# Patient Record
Sex: MALE | Race: BLACK OR AFRICAN AMERICAN | Employment: FULL TIME | URBAN - NONMETROPOLITAN AREA
[De-identification: names, ages, dates, MRNs, and addresses within clinical notes are randomized per-mention and may not be internally consistent; named-entity substitution may affect disease eponyms.]

---

## 2021-10-05 ENCOUNTER — HOSPITAL ENCOUNTER (EMERGENCY)
Age: 26
Discharge: HOME | End: 2021-10-05
Attending: STUDENT IN AN ORGANIZED HEALTH CARE EDUCATION/TRAINING PROGRAM

## 2021-10-05 VITALS
SYSTOLIC BLOOD PRESSURE: 138 MMHG | OXYGEN SATURATION: 97 % | WEIGHT: 160 LBS | BODY MASS INDEX: 20.53 KG/M2 | HEIGHT: 74 IN | HEART RATE: 83 BPM | TEMPERATURE: 98.7 F | RESPIRATION RATE: 17 BRPM | DIASTOLIC BLOOD PRESSURE: 67 MMHG

## 2021-10-05 DIAGNOSIS — Z20.822 COVID-19 VIRUS TEST RESULT UNKNOWN: ICD-10-CM

## 2021-10-05 DIAGNOSIS — B34.9 VIRAL SYNDROME: Primary | ICD-10-CM

## 2021-10-05 LAB
GROUP A STREP CULTURE, REFLEX: NEGATIVE
HETEROPHILE ANTIBODIES: NEGATIVE
REFLEX THROAT C + S: NORMAL

## 2021-10-05 PROCEDURE — 36415 COLL VENOUS BLD VENIPUNCTURE: CPT

## 2021-10-05 PROCEDURE — U0005 INFEC AGEN DETEC AMPLI PROBE: HCPCS

## 2021-10-05 PROCEDURE — 87880 STREP A ASSAY W/OPTIC: CPT

## 2021-10-05 PROCEDURE — 6370000000 HC RX 637 (ALT 250 FOR IP): Performed by: STUDENT IN AN ORGANIZED HEALTH CARE EDUCATION/TRAINING PROGRAM

## 2021-10-05 PROCEDURE — 99282 EMERGENCY DEPT VISIT SF MDM: CPT

## 2021-10-05 PROCEDURE — 86308 HETEROPHILE ANTIBODY SCREEN: CPT

## 2021-10-05 PROCEDURE — 87070 CULTURE OTHR SPECIMN AEROBIC: CPT

## 2021-10-05 PROCEDURE — U0003 INFECTIOUS AGENT DETECTION BY NUCLEIC ACID (DNA OR RNA); SEVERE ACUTE RESPIRATORY SYNDROME CORONAVIRUS 2 (SARS-COV-2) (CORONAVIRUS DISEASE [COVID-19]), AMPLIFIED PROBE TECHNIQUE, MAKING USE OF HIGH THROUGHPUT TECHNOLOGIES AS DESCRIBED BY CMS-2020-01-R: HCPCS

## 2021-10-05 RX ORDER — IBUPROFEN 200 MG
600 TABLET ORAL ONCE
Status: COMPLETED | OUTPATIENT
Start: 2021-10-05 | End: 2021-10-05

## 2021-10-05 RX ORDER — IBUPROFEN 600 MG/1
600 TABLET ORAL EVERY 6 HOURS PRN
Qty: 30 TABLET | Refills: 0 | Status: SHIPPED | OUTPATIENT
Start: 2021-10-05

## 2021-10-05 RX ADMIN — IBUPROFEN 600 MG: 200 TABLET, FILM COATED ORAL at 17:27

## 2021-10-05 ASSESSMENT — ENCOUNTER SYMPTOMS
CONSTIPATION: 0
SHORTNESS OF BREATH: 0
EYE REDNESS: 0
DIARRHEA: 0
SORE THROAT: 1
VOMITING: 0
NAUSEA: 0
RHINORRHEA: 0
COUGH: 1
ABDOMINAL PAIN: 0

## 2021-10-05 ASSESSMENT — PAIN SCALES - GENERAL
PAINLEVEL_OUTOF10: 7
PAINLEVEL_OUTOF10: 7

## 2021-10-05 ASSESSMENT — PAIN DESCRIPTION - LOCATION: LOCATION: GENERALIZED

## 2021-10-05 ASSESSMENT — PAIN DESCRIPTION - PAIN TYPE: TYPE: ACUTE PAIN

## 2021-10-05 NOTE — ED PROVIDER NOTES
[facility] ENCOUNTER          Pt Name: Harmony Chávez  MRN: [id number]  [place] 1995  Date of evaluation: 10/5/2021  Physician: Dasha Dupree MD    CHIEF COMPLAINT       Chief Complaint   Patient presents with    Generalized Body Aches     History obtained from the patient. HISTORY OF PRESENT ILLNESS    HPI  Harmony Chávez is a 27 y.o. male with a history of asthma who presents to the emergency department for evaluation of myalgias, fatigue, sore throat. Patient states that he just arrived from [address] [place] [address] yesterday evening and since arriving has had generalized fatigue, myalgias, sore throat, and cough. Patient states that he also has chills. Patient denies nausea/vomiting. He states that he feels \"rundown\". Patient denies shortness of breath, chest pain. Patient denies taking any medications prior to arrival.  Patient denies sick contacts. Patient has not received his Covid vaccines. The patient has no other acute complaints at this time. REVIEW OF SYSTEMS   Review of Systems   Constitutional: Positive for fatigue. Negative for chills and fever. HENT: Positive for sore throat. Negative for rhinorrhea. Eyes: Negative for redness and visual disturbance. Respiratory: Positive for cough. Negative for shortness of breath. Cardiovascular: Negative for chest pain. Gastrointestinal: Negative for abdominal pain, constipation, diarrhea, nausea and vomiting. Endocrine: Negative for polyuria. Genitourinary: Negative for dysuria. Musculoskeletal: Positive for myalgias. Negative for arthralgias. Skin: Negative for rash. Neurological: Negative for syncope and headaches. Psychiatric/Behavioral: Negative for confusion. PAST MEDICAL AND SURGICAL HISTORY   No past medical history on file. No past surgical history on file. MEDICATIONS   No current facility-administered medications for this encounter.     Current Outpatient Medications:     ibuprofen (IBU) 600 MG tablet, Take 1 tablet by mouth every 6 hours as needed for Pain, Disp: 30 tablet, Rfl: 0      SOCIAL HISTORY     Social History     Social History Narrative    Not on file     Social History     Tobacco Use    Smoking status: Not on file   Substance Use Topics    Alcohol use: Not on file    Drug use: Not on file         ALLERGIES   No Known Allergies      FAMILY HISTORY   No family history on file. PREVIOUS RECORDS   Previous records reviewed: This is this patient's first visit to [facility] ED, no previous records available on EMR. .        PHYSICAL EXAM     ED Triage Vitals [10/05/21 1641]   BP Temp Temp Source Pulse Resp SpO2 Height Weight   138/67 98.7 °F (37.1 °C) Oral 83 17 97 % 6' 2\" (1.88 m) 160 lb (72.6 kg)     Initial vital signs and nursing assessment reviewed and normal. Body mass index is 20.54 kg/m². Pulsoximetry is normal per my interpretation. Additional Vital Signs:  Vitals:    10/05/21 1641   BP: 138/67   Pulse: 83   Resp: 17   Temp: 98.7 °F (37.1 °C)   SpO2: 97%       Physical Exam  Vitals and nursing note reviewed. Constitutional:       General: He is not in acute distress. Appearance: Normal appearance. HENT:      Head: Normocephalic and atraumatic. Mouth/Throat:      Mouth: Mucous membranes are moist.      Pharynx: Oropharyngeal exudate and posterior oropharyngeal erythema present. Comments: Bilateral tonsillar exudates with erythema  Eyes:      Extraocular Movements: Extraocular movements intact. Conjunctiva/sclera: Conjunctivae normal.      Pupils: Pupils are equal, round, and reactive to light. Cardiovascular:      Rate and Rhythm: Normal rate and regular rhythm. Pulses: Normal pulses. Heart sounds: Normal heart sounds. Pulmonary:      Effort: Pulmonary effort is normal.      Breath sounds: Normal breath sounds. Abdominal:      General: Abdomen is flat. Palpations: Abdomen is soft. Tenderness: There is no abdominal tenderness. Musculoskeletal:         General: Normal range of motion. Cervical back: Normal range of motion and neck supple. Skin:     General: Skin is warm and dry. Capillary Refill: Capillary refill takes less than 2 seconds. Neurological:      General: No focal deficit present. Mental Status: He is alert and oriented to person, place, and time. Psychiatric:         Mood and Affect: Mood normal.         Behavior: Behavior normal.             MEDICAL DECISION MAKING   Initial Assessment:   Trupti Butler is a 27 y.o. male with a history of asthma who presents to the emergency department for evaluation of myalgias, fatigue, sore throat. Patient is afebrile, hemodynamically stable and in no distress. Differential diagnosis includes but is not limited to COVID-19, strep pharyngitis, infectious mononucleosis, viral syndrome. Plan:    Covid swab sent.  Rapid strep test/monospot   Patient given Motrin for symptomatic relief. ED RESULTS   Laboratory results:  Labs Reviewed   CULTURE, THROAT    Narrative:     Source: throat       Site:           Current Antibiotics: not stated   COVID-19   GROUP A STREP, REFLEX   MONONUCLEOSIS SCREEN       Radiologic studies results:  No orders to display             ED COURSE   ED Medications administered this visit:   Medications   ibuprofen (ADVIL;MOTRIN) tablet 600 mg (600 mg Oral Given 10/5/21 1727)     Rapid strep negative, culture sent. Monospot negative. Upon reevaluation, patient states that he feels significantly improved and his symptoms have almost completely resolved. Patient is well-appearing at this time no need for further work-up at this time. Patient instructed to self isolate due to possible Covid infection for 14 days or until Covid test is negative. Patient provided with a work note.     Strict return precautions were discussed with the patient including worsening shortness of breath, fever, inability to tolerate p.o. intake. Patient verbalized agreement and understanding with this plan. Prescription for Motrin was sent to pharmacy. All patient's questions were answered and patient in agreement with plan of care. Patient discharged in stable condition. MEDICATION CHANGES     Discharge Medication List as of 10/5/2021  7:18 PM      START taking these medications    Details   ibuprofen (IBU) 600 MG tablet Take 1 tablet by mouth every 6 hours as needed for Pain, Disp-30 tablet, R-0Normal               FINAL DISPOSITION     Final diagnoses:   Viral syndrome   COVID-19 virus test result unknown     Condition: condition: good  Dispo: Discharge to home      This transcription was electronically signed. It was dictated by use of voice recognition software and electronically transcribed. The transcription may contain errors not detected in proofreading.         Scar Zhao MD  10/05/21 1956

## 2021-10-05 NOTE — ED TRIAGE NOTES
Presents to ER with complaints of body aches that began yesterday. Pt states he is from [address] [place] [address] [facility] his body started to ache.

## 2021-10-05 NOTE — Clinical Note
Joanne Landry was seen and treated in our emergency department on 10/5/2021. He may return to work on 10/19/2021. May return to work on 10/19/21 or can return if/when Covid-19 test is negative. If you have any questions or concerns, please don't hesitate to call.       Chetna Roach MD

## 2021-10-06 ENCOUNTER — CARE COORDINATION (OUTPATIENT)
Dept: CARE COORDINATION | Age: 26
End: 2021-10-06

## 2021-10-07 LAB
SARS-COV-2: NOT DETECTED
SOURCE: NORMAL
THROAT/NOSE CULTURE: NORMAL

## 2021-10-07 NOTE — CARE COORDINATION
Patient contacted regarding COVID-19 risk. Discussed COVID-19 related testing which was pending at this time. Test results were pending. Patient informed of results, if available? No.      Ambulatory Care Manager contacted the patient by telephone to perform post discharge assessment. Call within 2 business days of discharge: Yes. Verified name and  with patient as identifiers. Provided introduction to self, and explanation of the CTN/ACM role, and reason for call due to risk factors for infection and/or exposure to COVID-19. Symptoms reviewed with patient who verbalized the following symptoms: all sx's resolved. Due to no new or worsening symptoms encounter was not routed to provider for escalation. Discussed follow-up appointments. If no appointment was previously scheduled, appointment scheduling offered: No.  [facility] follow up appointment(s): No future appointments. Non-[facility] follow up appointment(s): from out of [place]      Non-face-to-face services provided:  Obtained and reviewed discharge summary and/or continuity of care documents     Advance Care Planning:   Does patient have an Advance Directive:  not able to review during call. Educated patient about risk for severe COVID-19 due to risk factors according to CDC guidelines. ACM reviewed discharge instructions, medical action plan and red flag symptoms with the patient who verbalized understanding. Discussed COVID vaccination status: No. Education provided on COVID-19 vaccination as appropriate. Discussed exposure protocols and quarantine with CDC Guidelines. Patient was given an opportunity to verbalize any questions and concerns and agrees to contact ACM or health care provider for questions related to their healthcare. Reviewed and educated patient on any new and changed medications related to discharge diagnosis     Was patient discharged with a pulse oximeter?  No Discussed and confirmed pulse oximeter discharge instructions and when to notify provider or seek emergency care. ACM provided contact information. No further follow-up call identified based on severity of symptoms and risk factors.